# Patient Record
Sex: MALE | Race: WHITE | NOT HISPANIC OR LATINO | Employment: UNEMPLOYED | URBAN - METROPOLITAN AREA
[De-identification: names, ages, dates, MRNs, and addresses within clinical notes are randomized per-mention and may not be internally consistent; named-entity substitution may affect disease eponyms.]

---

## 2023-12-06 ENCOUNTER — TELEPHONE (OUTPATIENT)
Dept: NEUROSURGERY | Facility: CLINIC | Age: 58
End: 2023-12-06
Payer: MEDICARE

## 2023-12-06 NOTE — TELEPHONE ENCOUNTER
----- Message from Ana Carrillo sent at 12/4/2023  2:25 PM CST -----  Contact: Dr Hutchison-FAX  12/4/23    Referral for pt scanned into media mgr, DX Radiculopathy.  Addressed to Dr Dodd, provider not in system.    Please call pt to schedule .[phone]      Thanks    Ana GIRON

## 2024-06-13 ENCOUNTER — TELEPHONE (OUTPATIENT)
Dept: NEUROSURGERY | Facility: CLINIC | Age: 59
End: 2024-06-13
Payer: MEDICARE

## 2024-06-13 NOTE — TELEPHONE ENCOUNTER
----- Message from Robert Ventura sent at 6/13/2024  1:42 PM CDT -----  Regarding: Appt  Contact: [phone]  Rene from [facility]  calling to speak with someone in provider office regards scheduling an appt. Please call pt back at  [phone] and Rene at [phone]       Referral in Media

## 2024-06-13 NOTE — TELEPHONE ENCOUNTER
"GERMÁN HAS BEEN CALLED PATIENT HAS BEEN SCHEDULED. GERMÁN HAS BEEN INFORMED THAT PATIENT NEEDS TO HAVE AN MRI AND BRIG IN ON DISC. PER GERMÁN " SHE WILL HAVE PATIENT'S PCP ORDER NEW MRI AND INFORM PATIENT TO BRING IN WITH HIM A DISC TO HIS APPOINTMENT."  "

## 2024-07-26 ENCOUNTER — TELEPHONE (OUTPATIENT)
Dept: NEUROSURGERY | Facility: CLINIC | Age: 59
End: 2024-07-26
Payer: MEDICARE